# Patient Record
Sex: FEMALE | Race: WHITE
[De-identification: names, ages, dates, MRNs, and addresses within clinical notes are randomized per-mention and may not be internally consistent; named-entity substitution may affect disease eponyms.]

---

## 2020-09-03 ENCOUNTER — HOSPITAL ENCOUNTER (EMERGENCY)
Dept: HOSPITAL 54 - ER | Age: 60
Discharge: ANOTHER HOSPITAL | End: 2020-09-03
Payer: COMMERCIAL

## 2020-09-03 VITALS — DIASTOLIC BLOOD PRESSURE: 60 MMHG | SYSTOLIC BLOOD PRESSURE: 90 MMHG

## 2020-09-03 VITALS — BODY MASS INDEX: 15.95 KG/M2 | WEIGHT: 90 LBS | HEIGHT: 63 IN

## 2020-09-03 DIAGNOSIS — K92.2: ICD-10-CM

## 2020-09-03 DIAGNOSIS — I10: ICD-10-CM

## 2020-09-03 DIAGNOSIS — R10.9: Primary | ICD-10-CM

## 2020-09-03 DIAGNOSIS — Z87.19: ICD-10-CM

## 2020-09-03 DIAGNOSIS — G89.29: ICD-10-CM

## 2020-09-03 DIAGNOSIS — Z20.828: ICD-10-CM

## 2020-09-03 DIAGNOSIS — D64.9: ICD-10-CM

## 2020-09-03 DIAGNOSIS — M32.9: ICD-10-CM

## 2020-09-03 LAB
ALBUMIN SERPL BCP-MCNC: 1.7 G/DL (ref 3.4–5)
ALP SERPL-CCNC: 53 U/L (ref 46–116)
ALT SERPL W P-5'-P-CCNC: 27 U/L (ref 12–78)
AST SERPL W P-5'-P-CCNC: 24 U/L (ref 15–37)
BASOPHILS # BLD AUTO: 0 /CMM (ref 0–0.2)
BASOPHILS NFR BLD AUTO: 0.3 % (ref 0–2)
BILIRUB DIRECT SERPL-MCNC: 0.1 MG/DL (ref 0–0.2)
BILIRUB SERPL-MCNC: 0.3 MG/DL (ref 0.2–1)
BUN SERPL-MCNC: 7 MG/DL (ref 7–18)
CALCIUM SERPL-MCNC: 5.1 MG/DL (ref 8.5–10.1)
CHLORIDE SERPL-SCNC: 103 MMOL/L (ref 98–107)
CO2 SERPL-SCNC: 16 MMOL/L (ref 21–32)
CREAT SERPL-MCNC: 0.8 MG/DL (ref 0.6–1.3)
EOSINOPHIL NFR BLD AUTO: 0.6 % (ref 0–6)
GLUCOSE SERPL-MCNC: 164 MG/DL (ref 74–106)
HCT VFR BLD AUTO: 33 % (ref 33–45)
HGB BLD-MCNC: 10.5 G/DL (ref 11.5–14.8)
LIPASE SERPL-CCNC: 158 U/L (ref 73–393)
LYMPHOCYTES NFR BLD AUTO: 0.4 /CMM (ref 0.8–4.8)
LYMPHOCYTES NFR BLD AUTO: 6.1 % (ref 20–44)
MCHC RBC AUTO-ENTMCNC: 32 G/DL (ref 31–36)
MCV RBC AUTO: 83 FL (ref 82–100)
MONOCYTES NFR BLD AUTO: 0.3 /CMM (ref 0.1–1.3)
MONOCYTES NFR BLD AUTO: 4.7 % (ref 2–12)
NEUTROPHILS # BLD AUTO: 6.2 /CMM (ref 1.8–8.9)
NEUTROPHILS NFR BLD AUTO: 88.3 % (ref 43–81)
PLATELET # BLD AUTO: 489 /CMM (ref 150–450)
POTASSIUM SERPL-SCNC: 2.8 MMOL/L (ref 3.5–5.1)
PROT SERPL-MCNC: 5.5 G/DL (ref 6.4–8.2)
RBC # BLD AUTO: 3.98 MIL/UL (ref 4–5.2)
SODIUM SERPL-SCNC: 141 MMOL/L (ref 136–145)
WBC NRBC COR # BLD AUTO: 7 K/UL (ref 4.3–11)

## 2020-09-03 PROCEDURE — 83690 ASSAY OF LIPASE: CPT

## 2020-09-03 PROCEDURE — 96361 HYDRATE IV INFUSION ADD-ON: CPT

## 2020-09-03 PROCEDURE — 85025 COMPLETE CBC W/AUTO DIFF WBC: CPT

## 2020-09-03 PROCEDURE — 87081 CULTURE SCREEN ONLY: CPT

## 2020-09-03 PROCEDURE — 74177 CT ABD & PELVIS W/CONTRAST: CPT

## 2020-09-03 PROCEDURE — 99285 EMERGENCY DEPT VISIT HI MDM: CPT

## 2020-09-03 PROCEDURE — 96365 THER/PROPH/DIAG IV INF INIT: CPT

## 2020-09-03 PROCEDURE — C9113 INJ PANTOPRAZOLE SODIUM, VIA: HCPCS

## 2020-09-03 PROCEDURE — 87426 SARSCOV CORONAVIRUS AG IA: CPT

## 2020-09-03 PROCEDURE — 96367 TX/PROPH/DG ADDL SEQ IV INF: CPT

## 2020-09-03 PROCEDURE — 80048 BASIC METABOLIC PNL TOTAL CA: CPT

## 2020-09-03 PROCEDURE — 96375 TX/PRO/DX INJ NEW DRUG ADDON: CPT

## 2020-09-03 PROCEDURE — 36415 COLL VENOUS BLD VENIPUNCTURE: CPT

## 2020-09-03 PROCEDURE — 80076 HEPATIC FUNCTION PANEL: CPT

## 2020-09-03 RX ADMIN — Medication ONE MLS/HR: at 07:34

## 2020-09-03 RX ADMIN — Medication ONE MLS/HR: at 08:20

## 2020-09-03 NOTE — NUR
PATIENT ASLEEP BUT AROUSABLE, VITAL SIGNS TAKEN AND FILED, RIGHT AC IV MEDS 
INFUSING WELL, NO EDEMA, NO REDNESS. CONTINUE TO MONITOR.

## 2020-09-03 NOTE — NUR
TRANSFER INFO: GOING TO [facility]

                         REPORT TO MAITE ,[phone]

                          [facility] ETA 30-45 MINUTES

## 2020-09-03 NOTE — NUR
PT AAOX4. BIBRA C/O ABD PAIN RECENTLY ADMITTED TO [facility] LAST WEEK, DX: 
ULCERATIVE COLITIS. NO ACUTE DISTRESS NOTED. NIMO. MD AT BEDSIDE FOR EVAL. 
AWAITING ORDERS.

## 2020-09-03 NOTE — NUR
Patient complain of pain shes stated Morhine shot not working MD aware patient 
requested for Norco aware

## 2020-09-03 NOTE — NUR
PT ACCEPTED AT [facility] UNDER DR. GORMAN. WAITING FOR CM TO CALL FOR 
TRANSFER DETAILS.